# Patient Record
Sex: MALE | Race: OTHER | HISPANIC OR LATINO | URBAN - METROPOLITAN AREA
[De-identification: names, ages, dates, MRNs, and addresses within clinical notes are randomized per-mention and may not be internally consistent; named-entity substitution may affect disease eponyms.]

---

## 2022-08-02 ENCOUNTER — EMERGENCY (EMERGENCY)
Facility: HOSPITAL | Age: 32
LOS: 1 days | Discharge: HOME | End: 2022-08-02
Admitting: STUDENT IN AN ORGANIZED HEALTH CARE EDUCATION/TRAINING PROGRAM
Payer: COMMERCIAL

## 2022-08-02 VITALS
HEIGHT: 70 IN | HEART RATE: 83 BPM | DIASTOLIC BLOOD PRESSURE: 89 MMHG | SYSTOLIC BLOOD PRESSURE: 146 MMHG | RESPIRATION RATE: 18 BRPM | WEIGHT: 279.99 LBS | TEMPERATURE: 98 F

## 2022-08-02 LAB
ALBUMIN SERPL ELPH-MCNC: 4.4 G/DL — SIGNIFICANT CHANGE UP (ref 3.3–5)
ALP SERPL-CCNC: 119 U/L — SIGNIFICANT CHANGE UP (ref 40–120)
ALT FLD-CCNC: SIGNIFICANT CHANGE UP U/L (ref 10–45)
ANION GAP SERPL CALC-SCNC: 10 MMOL/L — SIGNIFICANT CHANGE UP (ref 5–17)
AST SERPL-CCNC: SIGNIFICANT CHANGE UP U/L (ref 10–40)
BILIRUB SERPL-MCNC: 0.4 MG/DL — SIGNIFICANT CHANGE UP (ref 0.2–1.2)
BUN SERPL-MCNC: 12 MG/DL — SIGNIFICANT CHANGE UP (ref 7–23)
CALCIUM SERPL-MCNC: 9.4 MG/DL — SIGNIFICANT CHANGE UP (ref 8.4–10.5)
CHLORIDE SERPL-SCNC: 103 MMOL/L — SIGNIFICANT CHANGE UP (ref 96–108)
CO2 SERPL-SCNC: 26 MMOL/L — SIGNIFICANT CHANGE UP (ref 22–31)
CREAT SERPL-MCNC: 0.78 MG/DL — SIGNIFICANT CHANGE UP (ref 0.5–1.3)
EGFR: 121 ML/MIN/1.73M2 — SIGNIFICANT CHANGE UP
GLUCOSE SERPL-MCNC: 86 MG/DL — SIGNIFICANT CHANGE UP (ref 70–99)
HAV IGM SER-ACNC: SIGNIFICANT CHANGE UP
HBV CORE IGM SER-ACNC: SIGNIFICANT CHANGE UP
HBV SURFACE AG SER-ACNC: SIGNIFICANT CHANGE UP
HCT VFR BLD CALC: 44.8 % — SIGNIFICANT CHANGE UP (ref 39–50)
HCV AB S/CO SERPL IA: 0.04 S/CO — SIGNIFICANT CHANGE UP
HCV AB SERPL-IMP: SIGNIFICANT CHANGE UP
HGB BLD-MCNC: 14.9 G/DL — SIGNIFICANT CHANGE UP (ref 13–17)
HIV 1+2 AB+HIV1 P24 AG SERPL QL IA: SIGNIFICANT CHANGE UP
MCHC RBC-ENTMCNC: 29.4 PG — SIGNIFICANT CHANGE UP (ref 27–34)
MCHC RBC-ENTMCNC: 33.3 GM/DL — SIGNIFICANT CHANGE UP (ref 32–36)
MCV RBC AUTO: 88.4 FL — SIGNIFICANT CHANGE UP (ref 80–100)
NRBC # BLD: 0 /100 WBCS — SIGNIFICANT CHANGE UP (ref 0–0)
PLATELET # BLD AUTO: 249 K/UL — SIGNIFICANT CHANGE UP (ref 150–400)
POTASSIUM SERPL-MCNC: SIGNIFICANT CHANGE UP MMOL/L (ref 3.5–5.3)
POTASSIUM SERPL-SCNC: SIGNIFICANT CHANGE UP MMOL/L (ref 3.5–5.3)
PROT SERPL-MCNC: 7.6 G/DL — SIGNIFICANT CHANGE UP (ref 6–8.3)
RBC # BLD: 5.07 M/UL — SIGNIFICANT CHANGE UP (ref 4.2–5.8)
RBC # FLD: 13 % — SIGNIFICANT CHANGE UP (ref 10.3–14.5)
SODIUM SERPL-SCNC: 139 MMOL/L — SIGNIFICANT CHANGE UP (ref 135–145)
WBC # BLD: 5.9 K/UL — SIGNIFICANT CHANGE UP (ref 3.8–10.5)
WBC # FLD AUTO: 5.9 K/UL — SIGNIFICANT CHANGE UP (ref 3.8–10.5)

## 2022-08-02 PROCEDURE — 85027 COMPLETE CBC AUTOMATED: CPT

## 2022-08-02 PROCEDURE — 87389 HIV-1 AG W/HIV-1&-2 AB AG IA: CPT

## 2022-08-02 PROCEDURE — 87491 CHLMYD TRACH DNA AMP PROBE: CPT

## 2022-08-02 PROCEDURE — 80074 ACUTE HEPATITIS PANEL: CPT

## 2022-08-02 PROCEDURE — 80053 COMPREHEN METABOLIC PANEL: CPT

## 2022-08-02 PROCEDURE — 86780 TREPONEMA PALLIDUM: CPT

## 2022-08-02 PROCEDURE — 87591 N.GONORRHOEAE DNA AMP PROB: CPT

## 2022-08-02 PROCEDURE — 99284 EMERGENCY DEPT VISIT MOD MDM: CPT

## 2022-08-02 PROCEDURE — 99283 EMERGENCY DEPT VISIT LOW MDM: CPT

## 2022-08-02 PROCEDURE — 36415 COLL VENOUS BLD VENIPUNCTURE: CPT

## 2022-08-02 NOTE — ED PROVIDER NOTE - NSFOLLOWUPINSTRUCTIONS_ED_ALL_ED_FT
YOU WILL BE CONTACTED WITH ANY ABNORMAL RESULTS.     Return to the Emergency Department if you have any new or worsening symptoms, or if you have any concerns.        Preventing Sexually Transmitted Infections, Adult      Sexually transmitted infections (STIs) are diseases that are spread from person to person (are contagious). They are spread, or transmitted, through bodily fluids exchanged during sex or sexual contact. These bodily fluids include saliva, semen, blood, vaginal mucus, and urine. STIs are very common among people of all ages.    Some common STIs include:  •Herpes.      •Hepatitis B.      •Chlamydia.      •Gonorrhea.      •Syphilis.      •HPV (human papillomavirus).      •HIV, also called the human immunodeficiency virus. This is the virus that can cause AIDS (acquired immunodeficiency syndrome).      Often, people who have these STIs do not have symptoms. Even without symptoms, these infections can be spread from person to person and require treatment.      How can these conditions affect me?    STIs can be treated, and many STIs can be cured. However, some STIs cannot be cured and will affect you for the rest of your life.    Certain STIs may:  •Require you to take medicine for the rest of your life.      •Affect your ability to have children (your fertility).    •Increase your risk for developing another STI or certain serious health conditions. These may include:  •Cervical cancer.      •Head and neck cancer.      •Pelvic inflammatory disease (PID), in women.      •Organ damage or damage to other parts of your body, if the infection spreads.        •Cause problems during pregnancy and may be transmitted to the baby during the pregnancy or childbirth.        What can increase my risk?    You may have an increased risk for developing an STI if:   •You have unprotected sex. Sex includes oral, vaginal, or anal sex.      •You have more than one sex partner.      •You have a sex partner who has multiple sex partners.      •You have sex with someone who has an STI.      •You have an STI or you had an STI before.      •You inject drugs or have a sex partner or partners who inject drugs.        What actions can I take to prevent STIs?    The only way to completely prevent STIs is not to have sex of any kind. This is called practicing abstinence. If you are sexually active, you can protect yourself and others by taking these actions to lower your risk of getting an STI:    Lifestyle     Avoid mixing alcohol, drugs, and sex. Alcohol and drug use can affect your ability to make good decisions and can lead to risky sexual behaviors.    Medicines     Ask your health care provider about taking pre-exposure prophylaxis (PrEP) to prevent HIV infection.      General information    •Stay up to date on vaccinations. Certain vaccines can lower your risk of getting certain STIs, such as:  •Hepatitis A and hepatitis B vaccines. You may have been vaccinated as a young child, but you will likely need a booster shot as a teen or young adult.      •HPV (human papillomavirus) vaccine.        •Have only one sex partner (be monogamous) or limit the number of sex partners you have.    •Use methods that prevent the exchange of body fluids between partners (barrier protection) correctly every time you have sex. Barrier protection can be used during oral, vaginal, or anal sex. Commonly used barrier methods include:  •Male condom.       •Female condom.       •Dental dam.        •Use a new condom for every sex act from start to finish.      •Get tested for STIs. Have your partners get tested, too.      •If you test positive for an STI, follow recommendations from your health care provider about treatment and make sure your sex partners are tested and treated.      •Birth control pills, injections, implants, and intrauterine devices (IUDs) do not protect against STIs. To prevent both STIs and pregnancy, always use a condom with another form of birth control.      •Some STIs, such as herpes, are spread through skin-to-skin contact. A condom may not protect you from getting such STIs. Avoid all sexual contact if you or your partners have herpes and there is an active flare with open sores.        Where to find more information    Learn more about STIs from:•Centers for Disease Control and Prevention:  •More information about specific STIs: cdc.gov/std      •Places to get sexual health counseling and treatment for free or at a low cost: gettested.cdc.gov        •[place] Department of Health and Human Services: www.womenshealth.gov        Summary    •Sexually transmitted infections (STIs) can spread through exchanging bodily fluids during sexual contact. Fluids include saliva, semen, blood, vaginal mucus, and urine.      •You may have an increased risk for developing an STI if you have unprotected sex. Sex includes oral, vaginal, or anal sex.      •If you do have sex, limit your number of sex partners and use barrier protection every time you have sex.      This information is not intended to replace advice given to you by your health care provider. Make sure you discuss any questions you have with your health care provider.      Document Revised: 02/01/2021 Document Reviewed: 02/01/2021    Elsevier Patient Education © 2022 [employer]

## 2022-08-02 NOTE — ED PROVIDER NOTE - NS ED ROS FT
General: no fever, chills, confusion  Cardiac: no chest pain, chest tightness, palpitations  Lungs: no sob, difficulty breathing  Abdomen: no abdominal pain, nausea, vomiting, diarrhea, constipation, nml BM  : no dysuria, urinary frequency/urgency    All other systems negative except as per HPI General: no fever, chills, confusion  Cardiac: no chest pain, chest tightness, palpitations  Lungs: no sob, difficulty breathing  Abdomen: no abdominal pain, nausea, vomiting, diarrhea, constipation, nml BM  : no dysuria, urinary frequency/urgency, penile/scrotal pain/swelling, dc  Skin: no rash/lesions    All other systems negative except as per HPI

## 2022-08-02 NOTE — ED ADULT TRIAGE NOTE - CHIEF COMPLAINT QUOTE
states, "I just want to get checked out because I feel off." pt poor historian. no further complaints. denies PMH.

## 2022-08-02 NOTE — ED PROVIDER NOTE - PHYSICAL EXAMINATION
CONSTITUTIONAL: Well-developed; well-nourished; in no acute distress.  SKIN: Warm and dry, no acute rash.  HEAD: Normocephalic; atraumatic.  EYES: PERRL, EOM intact; conjunctiva and sclera clear.  ENT: No nasal discharge; airway clear.  NECK: Supple; non tender.  EXT: Normal ROM. No clubbing, cyanosis or edema.  LYMPH: No acute cervical adenopathy.  NEURO: Alert, oriented. Grossly unremarkable.  PSYCH: Cooperative, appropriate.

## 2022-08-02 NOTE — ED ADULT NURSE NOTE - OBJECTIVE STATEMENT
Pt presents stating "i'm feeling off, I think I should get checked." Pt asked if he can describe what he means by feeling off, pt states he cannot. Pt reports shoulder surgery 6/24.

## 2022-08-02 NOTE — ED PROVIDER NOTE - OBJECTIVE STATEMENT
30 y/o M presents to ED requesting STD testing. Pt states he had unprotected sexual intercourse 2 weeks ago and wants to make sure he does not have any STDs. Denies any current symptoms including fever, chills, testicular pain or discharge, scrotal pain, or masses.

## 2022-08-02 NOTE — ED PROVIDER NOTE - CLINICAL SUMMARY MEDICAL DECISION MAKING FREE TEXT BOX
32 y/o M presents to ED requesting STD testing. Pt is currently asymptomatic in ED. Pt offered empiric treatment but declined. Will pend results to see if treatment is needed. Pt educated on safe sex practices going forward. 32 y/o M presents to ED requesting STD testing. Pt is currently asymptomatic in ED. Pt offered empiric treatment but declined. Will pend results to see if treatment is needed. Pt educated on safe sex practices going forward. I have discussed the discharge plan with the patient. The patient agrees with the plan, as discussed.  The patient understands Emergency Department diagnosis is a preliminary diagnosis often based on limited information and that the patient must adhere to the follow-up plan as discussed.  The patient understands that if the symptoms worsen or if prescribed medications do not have the desired/planned effect that the patient may return to the Emergency Department at any time for further evaluation and treatment.

## 2022-08-02 NOTE — ED PROVIDER NOTE - PATIENT PORTAL LINK FT
You can access the FollowMyHealth Patient Portal offered by [facility] by registering at the following website: [email or web address]. By joining [facility]’s FollowMyHealth portal, you will also be able to view your health information using other applications (apps) compatible with our system.

## 2022-08-03 LAB
C TRACH RRNA SPEC QL NAA+PROBE: SIGNIFICANT CHANGE UP
N GONORRHOEA RRNA SPEC QL NAA+PROBE: SIGNIFICANT CHANGE UP
SPECIMEN SOURCE: SIGNIFICANT CHANGE UP

## 2022-08-04 LAB — T PALLIDUM AB TITR SER: NEGATIVE — SIGNIFICANT CHANGE UP

## 2022-08-05 DIAGNOSIS — Z20.2 CONTACT WITH AND (SUSPECTED) EXPOSURE TO INFECTIONS WITH A PREDOMINANTLY SEXUAL MODE OF TRANSMISSION: ICD-10-CM

## 2022-09-03 ENCOUNTER — EMERGENCY (EMERGENCY)
Facility: HOSPITAL | Age: 32
LOS: 1 days | Discharge: HOME | End: 2022-09-03
Admitting: EMERGENCY MEDICINE
Payer: COMMERCIAL

## 2022-09-03 VITALS
TEMPERATURE: 98 F | HEART RATE: 76 BPM | WEIGHT: 279.99 LBS | DIASTOLIC BLOOD PRESSURE: 93 MMHG | HEIGHT: 70 IN | OXYGEN SATURATION: 97 % | RESPIRATION RATE: 15 BRPM | SYSTOLIC BLOOD PRESSURE: 144 MMHG

## 2022-09-03 LAB — SARS-COV-2 RNA SPEC QL NAA+PROBE: NEGATIVE — SIGNIFICANT CHANGE UP

## 2022-09-03 PROCEDURE — 99282 EMERGENCY DEPT VISIT SF MDM: CPT

## 2022-09-03 PROCEDURE — 87635 SARS-COV-2 COVID-19 AMP PRB: CPT

## 2022-09-03 NOTE — ED ADULT NURSE NOTE - OBJECTIVE STATEMENT
Patient a+o x4 c/o covid exposure 2-3 days ago, requests covid test. Reports asymptomatic. Speaking in full sentences without difficulty, denies sob/cp/dizziness/n/v/fever/chills. Labs collected and sent.

## 2022-09-03 NOTE — ED PROVIDER NOTE - PATIENT PORTAL LINK FT
You can access the FollowMyHealth Patient Portal offered by [facility] by registering at the following website: [email or web address]. By joining [facility]’s FollowMyHealth portal, you will also be able to view your health information using other applications (apps) compatible with our system.

## 2022-09-04 DIAGNOSIS — Z20.822 CONTACT WITH AND (SUSPECTED) EXPOSURE TO COVID-19: ICD-10-CM

## 2022-09-24 ENCOUNTER — EMERGENCY (EMERGENCY)
Facility: HOSPITAL | Age: 32
LOS: 1 days | Discharge: LEFT BEFORE DISCHARGE | End: 2022-09-24
Admitting: EMERGENCY MEDICINE

## 2022-09-24 VITALS
TEMPERATURE: 98 F | RESPIRATION RATE: 18 BRPM | HEIGHT: 70 IN | SYSTOLIC BLOOD PRESSURE: 152 MMHG | HEART RATE: 90 BPM | DIASTOLIC BLOOD PRESSURE: 90 MMHG | OXYGEN SATURATION: 98 %

## 2022-09-24 PROCEDURE — L9991: CPT

## 2022-09-26 DIAGNOSIS — Z00.8 ENCOUNTER FOR OTHER GENERAL EXAMINATION: ICD-10-CM

## 2022-09-26 DIAGNOSIS — Z53.21 PROCEDURE AND TREATMENT NOT CARRIED OUT DUE TO PATIENT LEAVING PRIOR TO BEING SEEN BY HEALTH CARE PROVIDER: ICD-10-CM

## 2022-10-06 ENCOUNTER — EMERGENCY (EMERGENCY)
Facility: HOSPITAL | Age: 32
LOS: 1 days | Discharge: HOME | End: 2022-10-06
Attending: EMERGENCY MEDICINE | Admitting: EMERGENCY MEDICINE
Payer: COMMERCIAL

## 2022-10-06 VITALS
SYSTOLIC BLOOD PRESSURE: 133 MMHG | RESPIRATION RATE: 18 BRPM | OXYGEN SATURATION: 100 % | HEIGHT: 70 IN | WEIGHT: 279.99 LBS | TEMPERATURE: 98 F | HEART RATE: 90 BPM | DIASTOLIC BLOOD PRESSURE: 76 MMHG

## 2022-10-06 DIAGNOSIS — R07.89 OTHER CHEST PAIN: ICD-10-CM

## 2022-10-06 PROCEDURE — 99284 EMERGENCY DEPT VISIT MOD MDM: CPT

## 2022-10-06 NOTE — ED ADULT NURSE NOTE - OBJECTIVE STATEMENT
Pt is 32 y.o male client complaining of chest discomfort that started yesterday. Pt denies pain, sob, abd pain, trauma or injury, n&v, dizziness, headache, tingling and numbness, urinary sx,  n&v. No known medical condition. EKG done. IV inserted and labs sent. Will cont to monitor.

## 2022-10-07 VITALS
TEMPERATURE: 98 F | HEART RATE: 80 BPM | OXYGEN SATURATION: 98 % | SYSTOLIC BLOOD PRESSURE: 121 MMHG | RESPIRATION RATE: 18 BRPM | DIASTOLIC BLOOD PRESSURE: 85 MMHG

## 2022-10-07 LAB
ALBUMIN SERPL ELPH-MCNC: 4.6 G/DL — SIGNIFICANT CHANGE UP (ref 3.3–5)
ALP SERPL-CCNC: 111 U/L — SIGNIFICANT CHANGE UP (ref 40–120)
ALT FLD-CCNC: 29 U/L — SIGNIFICANT CHANGE UP (ref 10–45)
ANION GAP SERPL CALC-SCNC: 13 MMOL/L — SIGNIFICANT CHANGE UP (ref 5–17)
AST SERPL-CCNC: 27 U/L — SIGNIFICANT CHANGE UP (ref 10–40)
BASOPHILS # BLD AUTO: 0.03 K/UL — SIGNIFICANT CHANGE UP (ref 0–0.2)
BASOPHILS NFR BLD AUTO: 0.4 % — SIGNIFICANT CHANGE UP (ref 0–2)
BILIRUB SERPL-MCNC: 0.5 MG/DL — SIGNIFICANT CHANGE UP (ref 0.2–1.2)
BUN SERPL-MCNC: 10 MG/DL — SIGNIFICANT CHANGE UP (ref 7–23)
CALCIUM SERPL-MCNC: 9 MG/DL — SIGNIFICANT CHANGE UP (ref 8.4–10.5)
CHLORIDE SERPL-SCNC: 102 MMOL/L — SIGNIFICANT CHANGE UP (ref 96–108)
CO2 SERPL-SCNC: 26 MMOL/L — SIGNIFICANT CHANGE UP (ref 22–31)
CREAT SERPL-MCNC: 0.87 MG/DL — SIGNIFICANT CHANGE UP (ref 0.5–1.3)
EGFR: 116 ML/MIN/1.73M2 — SIGNIFICANT CHANGE UP
EOSINOPHIL # BLD AUTO: 0.08 K/UL — SIGNIFICANT CHANGE UP (ref 0–0.5)
EOSINOPHIL NFR BLD AUTO: 1.1 % — SIGNIFICANT CHANGE UP (ref 0–6)
GLUCOSE SERPL-MCNC: 96 MG/DL — SIGNIFICANT CHANGE UP (ref 70–99)
HCT VFR BLD CALC: 43.7 % — SIGNIFICANT CHANGE UP (ref 39–50)
HGB BLD-MCNC: 14.3 G/DL — SIGNIFICANT CHANGE UP (ref 13–17)
IMM GRANULOCYTES NFR BLD AUTO: 0.3 % — SIGNIFICANT CHANGE UP (ref 0–0.9)
LYMPHOCYTES # BLD AUTO: 2.1 K/UL — SIGNIFICANT CHANGE UP (ref 1–3.3)
LYMPHOCYTES # BLD AUTO: 29.7 % — SIGNIFICANT CHANGE UP (ref 13–44)
MCHC RBC-ENTMCNC: 29.5 PG — SIGNIFICANT CHANGE UP (ref 27–34)
MCHC RBC-ENTMCNC: 32.7 GM/DL — SIGNIFICANT CHANGE UP (ref 32–36)
MCV RBC AUTO: 90.3 FL — SIGNIFICANT CHANGE UP (ref 80–100)
MONOCYTES # BLD AUTO: 0.52 K/UL — SIGNIFICANT CHANGE UP (ref 0–0.9)
MONOCYTES NFR BLD AUTO: 7.3 % — SIGNIFICANT CHANGE UP (ref 2–14)
NEUTROPHILS # BLD AUTO: 4.33 K/UL — SIGNIFICANT CHANGE UP (ref 1.8–7.4)
NEUTROPHILS NFR BLD AUTO: 61.2 % — SIGNIFICANT CHANGE UP (ref 43–77)
NRBC # BLD: 0 /100 WBCS — SIGNIFICANT CHANGE UP (ref 0–0)
PLATELET # BLD AUTO: 299 K/UL — SIGNIFICANT CHANGE UP (ref 150–400)
POTASSIUM SERPL-MCNC: 3.5 MMOL/L — SIGNIFICANT CHANGE UP (ref 3.5–5.3)
POTASSIUM SERPL-SCNC: 3.5 MMOL/L — SIGNIFICANT CHANGE UP (ref 3.5–5.3)
PROT SERPL-MCNC: 7.6 G/DL — SIGNIFICANT CHANGE UP (ref 6–8.3)
RBC # BLD: 4.84 M/UL — SIGNIFICANT CHANGE UP (ref 4.2–5.8)
RBC # FLD: 13 % — SIGNIFICANT CHANGE UP (ref 10.3–14.5)
SODIUM SERPL-SCNC: 141 MMOL/L — SIGNIFICANT CHANGE UP (ref 135–145)
TROPONIN T SERPL-MCNC: <0.01 NG/ML — SIGNIFICANT CHANGE UP (ref 0–0.01)
WBC # BLD: 7.08 K/UL — SIGNIFICANT CHANGE UP (ref 3.8–10.5)
WBC # FLD AUTO: 7.08 K/UL — SIGNIFICANT CHANGE UP (ref 3.8–10.5)

## 2022-10-07 PROCEDURE — 71045 X-RAY EXAM CHEST 1 VIEW: CPT

## 2022-10-07 PROCEDURE — 71045 X-RAY EXAM CHEST 1 VIEW: CPT | Mod: 26

## 2022-10-07 PROCEDURE — 99285 EMERGENCY DEPT VISIT HI MDM: CPT | Mod: 25

## 2022-10-07 PROCEDURE — 36415 COLL VENOUS BLD VENIPUNCTURE: CPT

## 2022-10-07 PROCEDURE — 80053 COMPREHEN METABOLIC PANEL: CPT

## 2022-10-07 PROCEDURE — 84484 ASSAY OF TROPONIN QUANT: CPT

## 2022-10-07 PROCEDURE — 85025 COMPLETE CBC W/AUTO DIFF WBC: CPT

## 2022-10-07 PROCEDURE — 93005 ELECTROCARDIOGRAM TRACING: CPT

## 2022-10-07 NOTE — ED PROVIDER NOTE - NSTIMEPROVIDERCAREINITIATE_GEN_ER
Patient was calling regarding medication states she was discharged from [facility] on 6/18/2021, patient would like a call back today .    06-Oct-2022 23:24

## 2022-10-07 NOTE — ED PROVIDER NOTE - OBJECTIVE STATEMENT
32 m - co chest pain- gen chest pain x 2 days 32 m - co chest pain- gen chest pain x 2 days  constant waxes and wanes in severity- no sig exac/allev factors  pain is nonexertional - no f/c no ho pe/dvt  no ho syncope  mod severity  no meds taken for pain  has had similar pain in the past

## 2022-10-07 NOTE — ED PROVIDER NOTE - NSFOLLOWUPINSTRUCTIONS_ED_ALL_ED_FT
Nonspecific Chest Pain, Adult      Chest pain is an uncomfortable, tight, or painful feeling in the chest. The pain can feel like a crushing, aching, or squeezing pressure. A person can feel a burning or tingling sensation. Chest pain can also be felt in your back, neck, jaw, shoulder, or arm. This pain can be worse when you move, sneeze, or take a deep breath.    Chest pain can be caused by a condition that is life-threatening. This must be treated right away. It can also be caused by something that is not life-threatening. If you have chest pain, it can be hard to know the difference, so it is important to get help right away to make sure that you do not have a serious condition.    Some life-threatening causes of chest pain include:  •Heart attack.      •A tear in the body's main blood vessel (aortic dissection).      •Inflammation around your heart (pericarditis).      •A problem in the lungs, such as a blood clot (pulmonary embolism) or a collapsed lung (pneumothorax).      Some non life-threatening causes of chest pain include:  •Heartburn.      •Anxiety or stress.      •Damage to the bones, muscles, and cartilage that make up your chest wall.      •Pneumonia or bronchitis.      •Shingles infection (varicella-zoster virus).      Your chest pain may come and go. It may also be constant. Your health care provider will do tests and other studies to find the cause of your pain. Treatment will depend on the cause of your chest pain.      Follow these instructions at home:    Medicines     •Take over-the-counter and prescription medicines only as told by your health care provider.      •If you were prescribed an antibiotic medicine, take it as told by your health care provider. Do not stop taking the antibiotic even if you start to feel better.      Activity     •Avoid any activities that cause chest pain.      • Do not lift anything that is heavier than 10 lb (4.5 kg), or the limit that you are told, until your health care provider says that it is safe.      •Rest as directed by your health care provider.       •Return to your normal activities only as told by your health care provider. Ask your health care provider what activities are safe for you.        Lifestyle       A plate along with examples of foods in a healthy diet.       Silhouette of a person sitting on the floor doing yoga.     • Do not use any products that contain nicotine or tobacco, such as cigarettes, e-cigarettes, and chewing tobacco. If you need help quitting, ask your health care provider.      • Do not drink alcohol.    •Make healthy lifestyle changes as recommended. These may include:  •Getting regular exercise. Ask your health care provider to suggest some exercises that are safe for you.      •Eating a heart-healthy diet. This includes plenty of fresh fruits and vegetables, whole grains, low-fat (lean) protein, and low-fat dairy products. A dietitian can help you find healthy eating options.      •Maintaining a healthy weight.      •Managing any other health conditions you may have, such as high blood pressure (hypertension) or diabetes.      •Reducing stress, such as with yoga or relaxation techniques.        General instructions     •Pay attention to any changes in your symptoms.      •It is up to you to get the results of any tests that were done. Ask your health care provider, or the department that is doing the tests, when your results will be ready.      •Keep all follow-up visits as told by your health care provider. This is important.       •You may be asked to go for further testing if your chest pain does not go away.        Contact a health care provider if:    •Your chest pain does not go away.      •You feel depressed.      •You have a fever.      •You notice changes in your symptoms or develop new symptoms.        Get help right away if:    •Your chest pain gets worse.      •You have a cough that gets worse, or you cough up blood.      •You have severe pain in your abdomen.      •You faint.      •You have sudden, unexplained chest discomfort.      •You have sudden, unexplained discomfort in your arms, back, neck, or jaw.      •You have shortness of breath at any time.      •You suddenly start to sweat, or your skin gets clammy.      •You feel nausea or you vomit.      •You suddenly feel lightheaded or dizzy.      •You have severe weakness, or unexplained weakness or fatigue.      •Your heart begins to beat quickly, or it feels like it is skipping beats.      These symptoms may represent a serious problem that is an emergency. Do not wait to see if the symptoms will go away. Get medical help right away. Call your local emergency services (911 in the [place] Do not drive yourself to the hospital.       Summary    •Chest pain can be caused by a condition that is serious and requires urgent treatment. It may also be caused by something that is not life-threatening.      •Your health care provider may do lab tests and other studies to find the cause of your pain.      •Follow your health care provider's instructions on taking medicines, making lifestyle changes, and getting emergency treatment if symptoms become worse.      •Keep all follow-up visits as told by your health care provider. This includes visits for any further testing if your chest pain does not go away.      This information is not intended to replace advice given to you by your health care provider. Make sure you discuss any questions you have with your health care provider.      Document Revised: 03/03/2022 Document Reviewed: 03/03/2022    Elsevier Patient Education © 2022 [employer].

## 2022-10-07 NOTE — ED PROVIDER NOTE - PATIENT PORTAL LINK FT
You can access the FollowMyHealth Patient Portal offered by [facility] by registering at the following website: [email or web address]. By joining [facility]’s FollowMyHealth portal, you will also be able to view your health information using other applications (apps) compatible with our system.

## 2022-10-07 NOTE — ED PROVIDER NOTE - CLINICAL SUMMARY MEDICAL DECISION MAKING FREE TEXT BOX
cp x 2 days- vague constant- gen upper chest- no assoc sob- no ho syncope- ekg t inv III, avF- but doubt ischemia given age, lack of risk factors and neg trop w pain x 2 days- at rest- nonexertional